# Patient Record
Sex: FEMALE | Race: OTHER | NOT HISPANIC OR LATINO
[De-identification: names, ages, dates, MRNs, and addresses within clinical notes are randomized per-mention and may not be internally consistent; named-entity substitution may affect disease eponyms.]

---

## 2019-01-15 PROBLEM — Z00.00 ENCOUNTER FOR PREVENTIVE HEALTH EXAMINATION: Status: ACTIVE | Noted: 2019-01-15

## 2019-01-16 ENCOUNTER — ASOB RESULT (OUTPATIENT)
Age: 29
End: 2019-01-16

## 2019-01-16 ENCOUNTER — APPOINTMENT (OUTPATIENT)
Age: 29
End: 2019-01-16
Payer: MEDICAID

## 2019-01-16 PROCEDURE — 76816 OB US FOLLOW-UP PER FETUS: CPT

## 2019-02-20 ENCOUNTER — APPOINTMENT (OUTPATIENT)
Age: 29
End: 2019-02-20
Payer: MEDICAID

## 2019-02-20 ENCOUNTER — ASOB RESULT (OUTPATIENT)
Age: 29
End: 2019-02-20

## 2019-02-20 PROCEDURE — 76811 OB US DETAILED SNGL FETUS: CPT

## 2019-04-02 ENCOUNTER — APPOINTMENT (OUTPATIENT)
Dept: MATERNAL FETAL MEDICINE | Facility: CLINIC | Age: 29
End: 2019-04-02

## 2019-04-02 ENCOUNTER — APPOINTMENT (OUTPATIENT)
Dept: ANTEPARTUM | Facility: CLINIC | Age: 29
End: 2019-04-02

## 2019-05-08 ENCOUNTER — TRANSCRIPTION ENCOUNTER (OUTPATIENT)
Age: 29
End: 2019-05-08

## 2019-05-09 ENCOUNTER — INPATIENT (INPATIENT)
Facility: HOSPITAL | Age: 29
LOS: 4 days | Discharge: HOME | End: 2019-05-14
Attending: OBSTETRICS & GYNECOLOGY | Admitting: OBSTETRICS & GYNECOLOGY
Payer: COMMERCIAL

## 2019-05-09 ENCOUNTER — TRANSCRIPTION ENCOUNTER (OUTPATIENT)
Age: 29
End: 2019-05-09

## 2019-05-09 VITALS
HEART RATE: 83 BPM | DIASTOLIC BLOOD PRESSURE: 120 MMHG | TEMPERATURE: 98 F | SYSTOLIC BLOOD PRESSURE: 195 MMHG | RESPIRATION RATE: 19 BRPM

## 2019-05-09 DIAGNOSIS — Z98.891 HISTORY OF UTERINE SCAR FROM PREVIOUS SURGERY: Chronic | ICD-10-CM

## 2019-05-09 DIAGNOSIS — O47.03 FALSE LABOR BEFORE 37 COMPLETED WEEKS OF GESTATION, THIRD TRIMESTER: ICD-10-CM

## 2019-05-09 LAB
ALBUMIN SERPL ELPH-MCNC: 2.6 G/DL — LOW (ref 3.3–5.2)
ALLERGY+IMMUNOLOGY DIAG STUDY NOTE: SIGNIFICANT CHANGE UP
ALP SERPL-CCNC: 271 U/L — HIGH (ref 40–120)
ALT FLD-CCNC: 52 U/L — HIGH
AMYLASE P1 CFR SERPL: 35 U/L — LOW (ref 36–128)
ANION GAP SERPL CALC-SCNC: 13 MMOL/L — SIGNIFICANT CHANGE UP (ref 5–17)
APPEARANCE UR: CLEAR — SIGNIFICANT CHANGE UP
APTT BLD: 28.5 SEC — SIGNIFICANT CHANGE UP (ref 27.5–36.3)
AST SERPL-CCNC: 63 U/L — HIGH
BACTERIA # UR AUTO: NEGATIVE — SIGNIFICANT CHANGE UP
BASOPHILS # BLD AUTO: 0 K/UL — SIGNIFICANT CHANGE UP (ref 0–0.2)
BASOPHILS NFR BLD AUTO: 0.3 % — SIGNIFICANT CHANGE UP (ref 0–2)
BILIRUB SERPL-MCNC: 0.6 MG/DL — SIGNIFICANT CHANGE UP (ref 0.4–2)
BILIRUB UR-MCNC: NEGATIVE — SIGNIFICANT CHANGE UP
BLD GP AB SCN SERPL QL: ABNORMAL
BUN SERPL-MCNC: 19 MG/DL — SIGNIFICANT CHANGE UP (ref 8–20)
CALCIUM SERPL-MCNC: 8.5 MG/DL — LOW (ref 8.6–10.2)
CHLORIDE SERPL-SCNC: 99 MMOL/L — SIGNIFICANT CHANGE UP (ref 98–107)
CO2 SERPL-SCNC: 21 MMOL/L — LOW (ref 22–29)
COLOR SPEC: YELLOW — SIGNIFICANT CHANGE UP
CREAT ?TM UR-MCNC: 132 MG/DL — SIGNIFICANT CHANGE UP
CREAT ?TM UR-MCNC: 281 MG/DL — SIGNIFICANT CHANGE UP
CREAT SERPL-MCNC: 0.9 MG/DL — SIGNIFICANT CHANGE UP (ref 0.5–1.3)
DIFF PNL FLD: ABNORMAL
DIR ANTIGLOB POLYSPECIFIC INTERPRETATION: SIGNIFICANT CHANGE UP
EOSINOPHIL # BLD AUTO: 0.1 K/UL — SIGNIFICANT CHANGE UP (ref 0–0.5)
EOSINOPHIL NFR BLD AUTO: 1.2 % — SIGNIFICANT CHANGE UP (ref 0–6)
EPI CELLS # UR: SIGNIFICANT CHANGE UP
GLUCOSE SERPL-MCNC: 80 MG/DL — SIGNIFICANT CHANGE UP (ref 70–115)
GLUCOSE UR QL: NEGATIVE MG/DL — SIGNIFICANT CHANGE UP
HCT VFR BLD CALC: 49 % — HIGH (ref 37–47)
HGB BLD-MCNC: 16.7 G/DL — HIGH (ref 12–16)
HIV 1 & 2 AB SERPL IA.RAPID: SIGNIFICANT CHANGE UP
INR BLD: 0.81 RATIO — LOW (ref 0.88–1.16)
KETONES UR-MCNC: ABNORMAL
LDH SERPL L TO P-CCNC: 663 U/L — HIGH (ref 98–192)
LEUKOCYTE ESTERASE UR-ACNC: ABNORMAL
LIDOCAIN IGE QN: 29 U/L — SIGNIFICANT CHANGE UP (ref 22–51)
LYMPHOCYTES # BLD AUTO: 2.9 K/UL — SIGNIFICANT CHANGE UP (ref 1–4.8)
LYMPHOCYTES # BLD AUTO: 26.5 % — SIGNIFICANT CHANGE UP (ref 20–55)
MAGNESIUM SERPL-MCNC: 7.5 MG/DL — CRITICAL HIGH (ref 1.8–2.6)
MCHC RBC-ENTMCNC: 28.7 PG — SIGNIFICANT CHANGE UP (ref 27–31)
MCHC RBC-ENTMCNC: 34.1 G/DL — SIGNIFICANT CHANGE UP (ref 32–36)
MCV RBC AUTO: 84.3 FL — SIGNIFICANT CHANGE UP (ref 81–99)
MONOCYTES # BLD AUTO: 1.2 K/UL — HIGH (ref 0–0.8)
MONOCYTES NFR BLD AUTO: 11.4 % — HIGH (ref 3–10)
NEUTROPHILS # BLD AUTO: 6.5 K/UL — SIGNIFICANT CHANGE UP (ref 1.8–8)
NEUTROPHILS NFR BLD AUTO: 60.3 % — SIGNIFICANT CHANGE UP (ref 37–73)
NITRITE UR-MCNC: NEGATIVE — SIGNIFICANT CHANGE UP
PH UR: 6.5 — SIGNIFICANT CHANGE UP (ref 5–8)
PLATELET # BLD AUTO: 117 K/UL — LOW (ref 150–400)
POTASSIUM SERPL-MCNC: 4.4 MMOL/L — SIGNIFICANT CHANGE UP (ref 3.5–5.3)
POTASSIUM SERPL-SCNC: 4.4 MMOL/L — SIGNIFICANT CHANGE UP (ref 3.5–5.3)
PROT ?TM UR-MCNC: SIGNIFICANT CHANGE UP MG/DL (ref 0–12)
PROT ?TM UR-MCNC: SIGNIFICANT CHANGE UP MG/DL (ref 0–12)
PROT SERPL-MCNC: 6 G/DL — LOW (ref 6.6–8.7)
PROT UR-MCNC: 500 MG/DL
PROT/CREAT UR-RTO: SIGNIFICANT CHANGE UP RATIO
PROT/CREAT UR-RTO: SIGNIFICANT CHANGE UP RATIO
PROTHROM AB SERPL-ACNC: 9.2 SEC — LOW (ref 10–12.9)
RBC # BLD: 5.81 M/UL — HIGH (ref 4.4–5.2)
RBC # FLD: 15 % — SIGNIFICANT CHANGE UP (ref 11–15.6)
RBC CASTS # UR COMP ASSIST: ABNORMAL /HPF (ref 0–4)
SODIUM SERPL-SCNC: 133 MMOL/L — LOW (ref 135–145)
SP GR SPEC: 1.01 — SIGNIFICANT CHANGE UP (ref 1.01–1.02)
TYPE + AB SCN PNL BLD: SIGNIFICANT CHANGE UP
URATE SERPL-MCNC: 8.5 MG/DL — HIGH (ref 2.4–5.7)
UROBILINOGEN FLD QL: NEGATIVE MG/DL — SIGNIFICANT CHANGE UP
WBC # BLD: 10.8 K/UL — SIGNIFICANT CHANGE UP (ref 4.8–10.8)
WBC # FLD AUTO: 10.8 K/UL — SIGNIFICANT CHANGE UP (ref 4.8–10.8)
WBC UR QL: SIGNIFICANT CHANGE UP

## 2019-05-09 PROCEDURE — 59514 CESAREAN DELIVERY ONLY: CPT | Mod: 80,U7

## 2019-05-09 PROCEDURE — 74018 RADEX ABDOMEN 1 VIEW: CPT | Mod: 26

## 2019-05-09 RX ORDER — SODIUM CHLORIDE 9 MG/ML
500 INJECTION, SOLUTION INTRAVENOUS ONCE
Refills: 0 | Status: COMPLETED | OUTPATIENT
Start: 2019-05-09 | End: 2019-05-09

## 2019-05-09 RX ORDER — SIMETHICONE 80 MG/1
80 TABLET, CHEWABLE ORAL EVERY 4 HOURS
Refills: 0 | Status: DISCONTINUED | OUTPATIENT
Start: 2019-05-10 | End: 2019-05-14

## 2019-05-09 RX ORDER — LABETALOL HCL 100 MG
200 TABLET ORAL
Refills: 0 | Status: DISCONTINUED | OUTPATIENT
Start: 2019-05-09 | End: 2019-05-09

## 2019-05-09 RX ORDER — SODIUM CHLORIDE 9 MG/ML
1000 INJECTION, SOLUTION INTRAVENOUS
Refills: 0 | Status: DISCONTINUED | OUTPATIENT
Start: 2019-05-09 | End: 2019-05-14

## 2019-05-09 RX ORDER — LANOLIN
1 OINTMENT (GRAM) TOPICAL EVERY 6 HOURS
Refills: 0 | Status: DISCONTINUED | OUTPATIENT
Start: 2019-05-10 | End: 2019-05-14

## 2019-05-09 RX ORDER — MAGNESIUM HYDROXIDE 400 MG/1
30 TABLET, CHEWABLE ORAL
Refills: 0 | Status: DISCONTINUED | OUTPATIENT
Start: 2019-05-10 | End: 2019-05-14

## 2019-05-09 RX ORDER — MAGNESIUM SULFATE 500 MG/ML
2 VIAL (ML) INJECTION
Qty: 40 | Refills: 0 | Status: DISCONTINUED | OUTPATIENT
Start: 2019-05-09 | End: 2019-05-09

## 2019-05-09 RX ORDER — NALOXONE HYDROCHLORIDE 4 MG/.1ML
0.1 SPRAY NASAL
Refills: 0 | Status: DISCONTINUED | OUTPATIENT
Start: 2019-05-09 | End: 2019-05-14

## 2019-05-09 RX ORDER — ACETAMINOPHEN 500 MG
975 TABLET ORAL EVERY 6 HOURS
Refills: 0 | Status: DISCONTINUED | OUTPATIENT
Start: 2019-05-10 | End: 2019-05-14

## 2019-05-09 RX ORDER — GLYCERIN ADULT
1 SUPPOSITORY, RECTAL RECTAL AT BEDTIME
Refills: 0 | Status: DISCONTINUED | OUTPATIENT
Start: 2019-05-10 | End: 2019-05-14

## 2019-05-09 RX ORDER — TETANUS TOXOID, REDUCED DIPHTHERIA TOXOID AND ACELLULAR PERTUSSIS VACCINE, ADSORBED 5; 2.5; 8; 8; 2.5 [IU]/.5ML; [IU]/.5ML; UG/.5ML; UG/.5ML; UG/.5ML
0.5 SUSPENSION INTRAMUSCULAR ONCE
Refills: 0 | Status: DISCONTINUED | OUTPATIENT
Start: 2019-05-10 | End: 2019-05-14

## 2019-05-09 RX ORDER — OXYTOCIN 10 UNIT/ML
333.33 VIAL (ML) INJECTION
Qty: 20 | Refills: 0 | Status: DISCONTINUED | OUTPATIENT
Start: 2019-05-09 | End: 2019-05-09

## 2019-05-09 RX ORDER — MAGNESIUM SULFATE 500 MG/ML
4 VIAL (ML) INJECTION ONCE
Refills: 0 | Status: DISCONTINUED | OUTPATIENT
Start: 2019-05-09 | End: 2019-05-09

## 2019-05-09 RX ORDER — METOCLOPRAMIDE HCL 10 MG
10 TABLET ORAL ONCE
Refills: 0 | Status: DISCONTINUED | OUTPATIENT
Start: 2019-05-09 | End: 2019-05-09

## 2019-05-09 RX ORDER — OXYCODONE HYDROCHLORIDE 5 MG/1
10 TABLET ORAL
Refills: 0 | Status: DISCONTINUED | OUTPATIENT
Start: 2019-05-09 | End: 2019-05-14

## 2019-05-09 RX ORDER — FAMOTIDINE 10 MG/ML
20 INJECTION INTRAVENOUS ONCE
Refills: 0 | Status: DISCONTINUED | OUTPATIENT
Start: 2019-05-09 | End: 2019-05-09

## 2019-05-09 RX ORDER — MAGNESIUM SULFATE 500 MG/ML
4 VIAL (ML) INJECTION ONCE
Refills: 0 | Status: COMPLETED | OUTPATIENT
Start: 2019-05-09 | End: 2019-05-09

## 2019-05-09 RX ORDER — DOCUSATE SODIUM 100 MG
100 CAPSULE ORAL
Refills: 0 | Status: DISCONTINUED | OUTPATIENT
Start: 2019-05-10 | End: 2019-05-14

## 2019-05-09 RX ORDER — SODIUM CHLORIDE 9 MG/ML
1000 INJECTION, SOLUTION INTRAVENOUS
Refills: 0 | Status: DISCONTINUED | OUTPATIENT
Start: 2019-05-09 | End: 2019-05-09

## 2019-05-09 RX ORDER — HYDROMORPHONE HYDROCHLORIDE 2 MG/ML
0.5 INJECTION INTRAMUSCULAR; INTRAVENOUS; SUBCUTANEOUS
Refills: 0 | Status: DISCONTINUED | OUTPATIENT
Start: 2019-05-09 | End: 2019-05-14

## 2019-05-09 RX ORDER — DIPHENHYDRAMINE HCL 50 MG
25 CAPSULE ORAL EVERY 6 HOURS
Refills: 0 | Status: DISCONTINUED | OUTPATIENT
Start: 2019-05-10 | End: 2019-05-14

## 2019-05-09 RX ORDER — MAGNESIUM SULFATE 500 MG/ML
2 VIAL (ML) INJECTION
Qty: 40 | Refills: 0 | Status: DISCONTINUED | OUTPATIENT
Start: 2019-05-09 | End: 2019-05-13

## 2019-05-09 RX ORDER — OXYCODONE HYDROCHLORIDE 5 MG/1
5 TABLET ORAL ONCE
Refills: 0 | Status: DISCONTINUED | OUTPATIENT
Start: 2019-05-10 | End: 2019-05-14

## 2019-05-09 RX ORDER — SODIUM CHLORIDE 9 MG/ML
1000 INJECTION, SOLUTION INTRAVENOUS ONCE
Refills: 0 | Status: DISCONTINUED | OUTPATIENT
Start: 2019-05-09 | End: 2019-05-09

## 2019-05-09 RX ORDER — KETOROLAC TROMETHAMINE 30 MG/ML
30 SYRINGE (ML) INJECTION EVERY 6 HOURS
Refills: 0 | Status: COMPLETED | OUTPATIENT
Start: 2019-05-09 | End: 2019-05-10

## 2019-05-09 RX ORDER — ONDANSETRON 8 MG/1
4 TABLET, FILM COATED ORAL EVERY 6 HOURS
Refills: 0 | Status: DISCONTINUED | OUTPATIENT
Start: 2019-05-09 | End: 2019-05-14

## 2019-05-09 RX ORDER — CITRIC ACID/SODIUM CITRATE 300-500 MG
30 SOLUTION, ORAL ORAL ONCE
Refills: 0 | Status: DISCONTINUED | OUTPATIENT
Start: 2019-05-09 | End: 2019-05-09

## 2019-05-09 RX ORDER — LABETALOL HCL 100 MG
20 TABLET ORAL ONCE
Refills: 0 | Status: COMPLETED | OUTPATIENT
Start: 2019-05-09 | End: 2019-05-09

## 2019-05-09 RX ORDER — IBUPROFEN 200 MG
600 TABLET ORAL EVERY 6 HOURS
Refills: 0 | Status: COMPLETED | OUTPATIENT
Start: 2019-05-10 | End: 2020-04-07

## 2019-05-09 RX ORDER — DIPHENHYDRAMINE HCL 50 MG
50 CAPSULE ORAL EVERY 4 HOURS
Refills: 0 | Status: DISCONTINUED | OUTPATIENT
Start: 2019-05-09 | End: 2019-05-14

## 2019-05-09 RX ORDER — FENTANYL CITRATE 50 UG/ML
30 INJECTION INTRAVENOUS
Refills: 0 | Status: DISCONTINUED | OUTPATIENT
Start: 2019-05-09 | End: 2019-05-10

## 2019-05-09 RX ORDER — LABETALOL HCL 100 MG
200 TABLET ORAL
Refills: 0 | Status: DISCONTINUED | OUTPATIENT
Start: 2019-05-09 | End: 2019-05-11

## 2019-05-09 RX ORDER — OXYCODONE HYDROCHLORIDE 5 MG/1
5 TABLET ORAL
Refills: 0 | Status: DISCONTINUED | OUTPATIENT
Start: 2019-05-10 | End: 2019-05-14

## 2019-05-09 RX ORDER — OXYCODONE HYDROCHLORIDE 5 MG/1
5 TABLET ORAL
Refills: 0 | Status: DISCONTINUED | OUTPATIENT
Start: 2019-05-09 | End: 2019-05-14

## 2019-05-09 RX ORDER — CEFAZOLIN SODIUM 1 G
2000 VIAL (EA) INJECTION EVERY 8 HOURS
Refills: 0 | Status: COMPLETED | OUTPATIENT
Start: 2019-05-09 | End: 2019-05-10

## 2019-05-09 RX ORDER — OXYTOCIN 10 UNIT/ML
333.33 VIAL (ML) INJECTION
Qty: 20 | Refills: 0 | Status: DISCONTINUED | OUTPATIENT
Start: 2019-05-09 | End: 2019-05-14

## 2019-05-09 RX ORDER — CEFTRIAXONE 500 MG/1
2 INJECTION, POWDER, FOR SOLUTION INTRAMUSCULAR; INTRAVENOUS ONCE
Refills: 0 | Status: DISCONTINUED | OUTPATIENT
Start: 2019-05-09 | End: 2019-05-09

## 2019-05-09 RX ADMIN — Medication 30 MILLIGRAM(S): at 18:50

## 2019-05-09 RX ADMIN — HYDROMORPHONE HYDROCHLORIDE 0.5 MILLIGRAM(S): 2 INJECTION INTRAMUSCULAR; INTRAVENOUS; SUBCUTANEOUS at 15:45

## 2019-05-09 RX ADMIN — FENTANYL CITRATE 30 MILLILITER(S): 50 INJECTION INTRAVENOUS at 17:51

## 2019-05-09 RX ADMIN — Medication 50 GM/HR: at 15:25

## 2019-05-09 RX ADMIN — Medication 100 MILLIGRAM(S): at 13:02

## 2019-05-09 RX ADMIN — HYDROMORPHONE HYDROCHLORIDE 0.5 MILLIGRAM(S): 2 INJECTION INTRAMUSCULAR; INTRAVENOUS; SUBCUTANEOUS at 16:15

## 2019-05-09 RX ADMIN — SODIUM CHLORIDE 125 MILLILITER(S): 9 INJECTION, SOLUTION INTRAVENOUS at 22:10

## 2019-05-09 RX ADMIN — Medication 100 MILLIGRAM(S): at 21:20

## 2019-05-09 RX ADMIN — SODIUM CHLORIDE 75 MILLILITER(S): 9 INJECTION, SOLUTION INTRAVENOUS at 18:00

## 2019-05-09 RX ADMIN — Medication 1000 MILLIUNIT(S)/MIN: at 13:10

## 2019-05-09 RX ADMIN — SODIUM CHLORIDE 2000 MILLILITER(S): 9 INJECTION, SOLUTION INTRAVENOUS at 15:05

## 2019-05-09 RX ADMIN — Medication 200 MILLIGRAM(S): at 19:49

## 2019-05-09 RX ADMIN — Medication 20 MILLIGRAM(S): at 12:54

## 2019-05-09 RX ADMIN — Medication 100 GRAM(S): at 14:58

## 2019-05-09 RX ADMIN — Medication 30 MILLIGRAM(S): at 18:27

## 2019-05-09 NOTE — CHART NOTE - NSCHARTNOTEFT_GEN_A_CORE
S: Patient states feels better. She denies SOB, headache, visual disturbances, RUQ pain, N/V or feeling of being lightheaded.    O:   Vital signs- BP: 116/78 P:86  SaO2: 95% on room air  Urine output: 30 cc past hour   Mag level: 7.5    General Appearance: able to answer all questions approp.  Cardiovascular: RRR, S1 and S2 WNL   + lower extremity edema   Lungs: clear lung sounds bilaterally   Extremities: DTRs 2+ brachial, negative clonus      A/P: 28 yo  at 35 weeks s/p  and MgSO4 for Preeclampsia, No signs/symptoms of Mg toxicity.   - Elevated Mg 7.5  - Will hold Mg (20h10)     Luke Powers MD PGY-1  D/w Dr. Rodriguez

## 2019-05-09 NOTE — OB PROVIDER H&P - ATTENDING COMMENTS
Patient presented with complains of back pain, headache. feeling like "blood pressure is elevated", denies any visual disturbances.   Of note we haven't seen her since March as she left the country until the last week.   severe range BP's  IV started, blood work collected, IV labetalol 20mg given  patient had a decel, then we were unable to find heartbeat on US was in the 70's,  decision was made to proceed with STAT  section- verbal consent obtained   mag for neuroprocection was ordered but not given in time  ppalos

## 2019-05-09 NOTE — OB PROVIDER H&P - HISTORY OF PRESENT ILLNESS
28 yo  at EGA 35 weeks, presented to L&D complaining of back pain, headache, visual disturbances for last 2 days. Returned from [place] after 2 month stay overseas 2 days ago and says that symptoms started when having landed. Says pain was located over epigastric area and radiated to the back. Denies any fevers, chills, chest pain. No loss of fluids, bleeding, discharge per vagina.     POBhx:  , pCS  for arrest of labor and decrease fetal HR  Denies other PMH.     Meds: Denies  Social: No smoking, drinking or drug use.

## 2019-05-09 NOTE — OB PROVIDER H&P - ASSESSMENT
28 yo  at 35 weeks EGA, found in L&D to have Hypertensive Emergency with decreased fetal HR and inability to reacquire proper monitoring tones. Was taken for Stat  due to fetal distress. 30 yo  at 35 weeks EGA, found in L&D to have Hypertensive Emergency with decreased fetal HR and inability to reacquire proper monitoring tones. Was taken for Stat  due to fetal distress.   Dr Prather and Resident Dr. Santillan at bedside

## 2019-05-09 NOTE — OB PROVIDER H&P - NSHPPHYSICALEXAM_GEN_ALL_CORE
Vital Signs Last 24 Hrs  T(C): 36.4 (09 May 2019 12:22), Max: 36.4 (09 May 2019 12:22)  T(F): 97.5 (09 May 2019 12:22), Max: 97.5 (09 May 2019 12:22)  HR: 78 (09 May 2019 12:56) (78 - 90)  BP: 196/111 (09 May 2019 12:50) (181/123 - 200/113)  BP(mean): --  RR: 19 (09 May 2019 12:22) (19 - 19)  SpO2: 100% (09 May 2019 12:56) (98% - 100%)

## 2019-05-09 NOTE — OB NEONATOLOGY/PEDIATRICIAN DELIVERY SUMMARY - NSPEDSNEONOTESA_OBGYN_ALL_OB_FT
Neonatologist called stat to OR #1 by Olinda Prather MD to attend Stat repeat C/S for birth of this 32 0/7 weeks infant after severe maternal hypertension.  Mother is a  31 year old G 3 P , blood type A negative, serology NR HBsAg negative, GBS unknown, HIV negative, Rubella immune mother with EDC 2019.  Denies allergies, denies hypertension, denies diabetes, denies Asthma.  Social History: single, denies smoking, denies alcohol abuse, denies illicit drug use.  Family history: unremarkable  ROS: unobtainable in .  Labor and Delivery.:  AROM 2019 @ C/S with clear amniotic fluid.  Upon delivery 2019@ 1309 hours, infant floppy, apneic and blue, placed on bed under radiant warmer, dried positioned and suctioned with bulb syringe.   Administered PPV via T-piece resuscitator with 40 % oxygen and increased to 100 & over 5 minutes.  Infant intubated with 3 ETT and administered PPV.  Chest compressions initiated when HR not well auscultated.  Good, equal breath sounds auscultated.  Good tone noted with increased spontaneous respirations, infant extubated.   Saturations noted to be less than 80 infant unsuccessfully reintubated.  PPV via T-piece resuscitator with mask continued.  Saturations 96 % on 100 % oxygen with a  as infant was transferred to NICU on Panda radiant warmer bed.  Apgar score 3, 4 and 7 at 1, 5 and 10 minutes respectively.  Infant transferred to NICU.  Future care with neonatology service.

## 2019-05-10 ENCOUNTER — RESULT REVIEW (OUTPATIENT)
Age: 29
End: 2019-05-10

## 2019-05-10 LAB
MAGNESIUM SERPL-MCNC: 4.6 MG/DL — HIGH (ref 1.6–2.6)
T PALLIDUM AB TITR SER: NEGATIVE — SIGNIFICANT CHANGE UP

## 2019-05-10 RX ORDER — KETOROLAC TROMETHAMINE 30 MG/ML
30 SYRINGE (ML) INJECTION EVERY 6 HOURS
Refills: 0 | Status: COMPLETED | OUTPATIENT
Start: 2019-05-10 | End: 2019-05-11

## 2019-05-10 RX ADMIN — Medication 100 MILLIGRAM(S): at 08:33

## 2019-05-10 RX ADMIN — Medication 975 MILLIGRAM(S): at 17:00

## 2019-05-10 RX ADMIN — Medication 200 MILLIGRAM(S): at 08:33

## 2019-05-10 RX ADMIN — Medication 975 MILLIGRAM(S): at 23:23

## 2019-05-10 RX ADMIN — Medication 200 MILLIGRAM(S): at 19:06

## 2019-05-10 RX ADMIN — Medication 100 MILLIGRAM(S): at 16:09

## 2019-05-10 RX ADMIN — Medication 975 MILLIGRAM(S): at 09:52

## 2019-05-10 RX ADMIN — Medication 975 MILLIGRAM(S): at 10:50

## 2019-05-10 RX ADMIN — Medication 975 MILLIGRAM(S): at 16:10

## 2019-05-10 NOTE — PROGRESS NOTE ADULT - SUBJECTIVE AND OBJECTIVE BOX
29y year old  now POD#1 s/p  section at 32wks gestation.     No acute overnight events. Pain well controlled.  Patient is ambulating, +haddad catheter still in place and set to come out later today, -flatus, -BM  Reports minimal lochia.   +breast feeding, -breast tenderness    VS:   Vital Signs Last 24 Hrs  T(C): 36.5 (10 May 2019 04:20), Max: 36.8 (09 May 2019 15:01)  T(F): 97.7 (10 May 2019 04:20), Max: 98.2 (09 May 2019 15:01)  HR: 77 (10 May 2019 04:20) (0 - 90)  BP: 98/66 (10 May 2019 04:20) (98/66 - 200/113)  RR: 18 (10 May 2019 04:20) (14 - 22)  SpO2: 95% (10 May 2019 04:20) (92% - 100%)    Physical Exam:  General: NAD  Abdomen: soft, ND, firm fundus palpated below the umbilicus. PREVENA dressing in place and intact, no bleeding or drainage.  Ext: nontender lower extremities, bilaterally.     Labs: post op CBC pending

## 2019-05-10 NOTE — DISCHARGE NOTE OB - MEDICATION SUMMARY - MEDICATIONS TO TAKE
I will START or STAY ON the medications listed below when I get home from the hospital:    ibuprofen 600 mg oral tablet  -- 1 tab(s) by mouth every 6 hours   -- Do not take this drug if you are pregnant.  It is very important that you take or use this exactly as directed.  Do not skip doses or discontinue unless directed by your doctor.  May cause drowsiness or dizziness.  Obtain medical advice before taking any non-prescription drugs as some may affect the action of this medication.  Take with food or milk.    -- Indication: For Pain    Percocet 5/325 oral tablet  -- 1 tab(s) by mouth every 6 hours MDD:4 tabs  -- Caution federal law prohibits the transfer of this drug to any person other  than the person for whom it was prescribed.  May cause drowsiness.  Alcohol may intensify this effect.  Use care when operating dangerous machinery.  This prescription cannot be refilled.  This product contains acetaminophen.  Do not use  with any other product containing acetaminophen to prevent possible liver damage.  Using more of this medication than prescribed may cause serious breathing problems.    -- Indication: For severe pain    labetalol 200 mg oral tablet  -- 1 tab(s) by mouth 2 times a day   -- It is very important that you take or use this exactly as directed.  Do not skip doses or discontinue unless directed by your doctor.  May cause drowsiness.  Alcohol may intensify this effect.  Use care when operating dangerous machinery.  Some non-prescription drugs may aggravate your condition.  Read all labels carefully.  If a warning appears, check with your doctor before taking.    -- Indication: For High blood pressure I will START or STAY ON the medications listed below when I get home from the hospital:    ibuprofen 600 mg oral tablet  -- 1 tab(s) by mouth every 6 hours   -- Do not take this drug if you are pregnant.  It is very important that you take or use this exactly as directed.  Do not skip doses or discontinue unless directed by your doctor.  May cause drowsiness or dizziness.  Obtain medical advice before taking any non-prescription drugs as some may affect the action of this medication.  Take with food or milk.    -- Indication: For Pain    labetalol 100 mg oral tablet  -- 2 tab(s) by mouth 2 times a day   -- It is very important that you take or use this exactly as directed.  Do not skip doses or discontinue unless directed by your doctor.  May cause drowsiness.  Alcohol may intensify this effect.  Use care when operating dangerous machinery.  Some non-prescription drugs may aggravate your condition.  Read all labels carefully.  If a warning appears, check with your doctor before taking.    -- Indication: For High blood pressure medication    Procardia XL 30 mg oral tablet, extended release  -- 1 tab(s) by mouth once a day (in the morning)   -- Avoid grapefruit and grapefruit juice while taking this medication.  It is very important that you take or use this exactly as directed.  Do not skip doses or discontinue unless directed by your doctor.  Some non-prescription drugs may aggravate your condition.  Read all labels carefully.  If a warning appears, check with your doctor before taking.  Swallow whole.  Do not crush.    -- Indication: For High blood pressure medication I will START or STAY ON the medications listed below when I get home from the hospital:    ibuprofen 600 mg oral tablet  -- 1 tab(s) by mouth every 6 hours   -- Do not take this drug if you are pregnant.  It is very important that you take or use this exactly as directed.  Do not skip doses or discontinue unless directed by your doctor.  May cause drowsiness or dizziness.  Obtain medical advice before taking any non-prescription drugs as some may affect the action of this medication.  Take with food or milk.    -- Indication: For Pain    labetalol 200 mg oral tablet  -- 2 tab(s) by mouth 2 times a day   -- It is very important that you take or use this exactly as directed.  Do not skip doses or discontinue unless directed by your doctor.  May cause drowsiness.  Alcohol may intensify this effect.  Use care when operating dangerous machinery.  Some non-prescription drugs may aggravate your condition.  Read all labels carefully.  If a warning appears, check with your doctor before taking.    -- Indication: For High blood pressure medication    Procardia XL 30 mg oral tablet, extended release  -- 1 tab(s) by mouth once a day (in the morning)   -- Avoid grapefruit and grapefruit juice while taking this medication.  It is very important that you take or use this exactly as directed.  Do not skip doses or discontinue unless directed by your doctor.  Some non-prescription drugs may aggravate your condition.  Read all labels carefully.  If a warning appears, check with your doctor before taking.  Swallow whole.  Do not crush.    -- Indication: For High blood pressure medication

## 2019-05-10 NOTE — CHART NOTE - NSCHARTNOTEFT_GEN_A_CORE
Provider called to assess patient's Prevena wound vac.  Patient has no complaints, aside from the noise made from the wound-vac.   Prevena in place with good vacuum, no fluid vacuumed.   Left service light blinked momentarily, indicating possible leak, spontaneously corrected.  Device is quite loud.   Uterine fundus firm.  Normal wound-vac function.

## 2019-05-10 NOTE — DISCHARGE NOTE OB - PLAN OF CARE
Recovery Patient should transition to regular activity level. Resume regular diet. Patient should follow up with her OB for a postpartum checkup 1-2 weeks after discharge from the hospital. Patient should call her doctor sooner if she develops a fever or uncontrolled vaginal bleeding.

## 2019-05-10 NOTE — DISCHARGE NOTE OB - CARE PLAN
Principal Discharge DX:	 delivery, delivered, current hospitalization  Goal:	Recovery  Assessment and plan of treatment:	Patient should transition to regular activity level. Resume regular diet. Patient should follow up with her OB for a postpartum checkup 1-2 weeks after discharge from the hospital. Patient should call her doctor sooner if she develops a fever or uncontrolled vaginal bleeding.

## 2019-05-10 NOTE — DISCHARGE NOTE OB - PATIENT PORTAL LINK FT
You can access the [email or web address], offered by [facility], by registering with the following website: [email or web address]

## 2019-05-10 NOTE — DISCHARGE NOTE OB - CARE PROVIDER_API CALL
Olinda Prather)  Obstetrics and Gynecology  [address] [place] [address]  Phone: [phone]  Fax: [phone]  Follow Up Time:

## 2019-05-10 NOTE — DISCHARGE NOTE OB - HOSPITAL COURSE
Patient s/p pCS at 32 weeks for category III tracing and pre-eclampsia with severe features. S/P Mg and on labetalol 200mg BID. Patient did not receive rhogam during pregnancy and has anti D antibodies. At the time of discharge patient was tolerating a regular diet, ambulating without assistance, voiding spontaneously and pain was well controlled with PRN medications. Patient aware of plan to follow up with her OB 1-2 weeks after discharge for BP check.

## 2019-05-10 NOTE — OB PROVIDER DELIVERY SUMMARY - NSPROVIDERDELIVERYNOTE_OBGYN_ALL_OB_FT
28 y/o  at 32w0d evaluated for severe range hypertension and Cat III tracing, decision was made for an emergent  section.   Repeat low transverse  section performed, a viable female infant delivered at 1309, placenta delivered at 1310, weight 1010 (2lbs 3oz). APGARS 3, 4, 7 at 1m, 5m and 10m.  Hysterotomy, rectus muscles, fascia, and skin reapproximated with suture, excellent hemostasis obtained at each layer of the closure.   EBL: 750 30 y/o  at 32w0d evaluated for severe range hypertension and Cat III tracing, decision was made for an emergent  section.   Repeat low transverse  section performed, a viable female infant delivered at 1309, placenta delivered at 1310, weight 1010 (2lbs 3oz). APGARS 3, 4, 7 at 1m, 5m and 10m.  Hysterotomy, rectus muscles, fascia, and skin reapproximated with suture, excellent hemostasis obtained at each layer of the closure.   EBL: 750    I was present throughout procedure  STAT  for fetal bradycardia @ 32 weeks with severe pre-eclampsia  Provena placed   ppalos

## 2019-05-11 DIAGNOSIS — O14.90 UNSPECIFIED PRE-ECLAMPSIA, UNSPECIFIED TRIMESTER: ICD-10-CM

## 2019-05-11 LAB
EOSINOPHIL NFR BLD AUTO: 1 % — SIGNIFICANT CHANGE UP (ref 0–5)
HCT VFR BLD CALC: 36.2 % — LOW (ref 37–47)
HGB BLD-MCNC: 11.5 G/DL — LOW (ref 12–16)
LYMPHOCYTES # BLD AUTO: 16 % — LOW (ref 20–55)
MCHC RBC-ENTMCNC: 27.9 PG — SIGNIFICANT CHANGE UP (ref 27–31)
MCHC RBC-ENTMCNC: 31.8 G/DL — LOW (ref 32–36)
MCV RBC AUTO: 87.9 FL — SIGNIFICANT CHANGE UP (ref 81–99)
MONOCYTES NFR BLD AUTO: 3 % — SIGNIFICANT CHANGE UP (ref 3–10)
NEUTROPHILS NFR BLD AUTO: 79 % — HIGH (ref 37–73)
NEUTS BAND # BLD: 1 % — SIGNIFICANT CHANGE UP (ref 0–8)
PLAT MORPH BLD: NORMAL — SIGNIFICANT CHANGE UP
PLATELET # BLD AUTO: 131 K/UL — LOW (ref 150–400)
RBC # BLD: 4.12 M/UL — LOW (ref 4.4–5.2)
RBC # FLD: 15.6 % — SIGNIFICANT CHANGE UP (ref 11–15.6)
RBC BLD AUTO: NORMAL — SIGNIFICANT CHANGE UP
WBC # BLD: 10.3 K/UL — SIGNIFICANT CHANGE UP (ref 4.8–10.8)
WBC # FLD AUTO: 10.3 K/UL — SIGNIFICANT CHANGE UP (ref 4.8–10.8)

## 2019-05-11 RX ORDER — IBUPROFEN 200 MG
600 TABLET ORAL EVERY 6 HOURS
Refills: 0 | Status: DISCONTINUED | OUTPATIENT
Start: 2019-05-11 | End: 2019-05-14

## 2019-05-11 RX ORDER — LABETALOL HCL 100 MG
200 TABLET ORAL THREE TIMES A DAY
Refills: 0 | Status: DISCONTINUED | OUTPATIENT
Start: 2019-05-11 | End: 2019-05-14

## 2019-05-11 RX ADMIN — Medication 975 MILLIGRAM(S): at 00:20

## 2019-05-11 RX ADMIN — Medication 200 MILLIGRAM(S): at 05:19

## 2019-05-11 RX ADMIN — Medication 600 MILLIGRAM(S): at 17:50

## 2019-05-11 RX ADMIN — Medication 200 MILLIGRAM(S): at 16:52

## 2019-05-11 RX ADMIN — OXYCODONE HYDROCHLORIDE 5 MILLIGRAM(S): 5 TABLET ORAL at 06:20

## 2019-05-11 RX ADMIN — Medication 600 MILLIGRAM(S): at 16:52

## 2019-05-11 RX ADMIN — OXYCODONE HYDROCHLORIDE 5 MILLIGRAM(S): 5 TABLET ORAL at 05:28

## 2019-05-11 NOTE — PROGRESS NOTE ADULT - SUBJECTIVE AND OBJECTIVE BOX
29y year old  now POD#2 s/p  section at 32wks gestation.     No acute overnight events. Pain well controlled.  Patient is ambulating, +voiding, +Flatus, -BM  Reports minimal lochia.   +breast feeding, -breast tenderness    VS:   Vital Signs Last 24 Hrs  T(C): 36.6 (11 May 2019 05:44), Max: 36.9 (10 May 2019 19:43)  T(F): 97.9 (11 May 2019 05:44), Max: 98.4 (10 May 2019 19:43)  HR: 80 (11 May 2019 05:44) (65 - 80)  BP: 145/89 (11 May 2019 06:44) (119/79 - 158/92)  RR: 18 (11 May 2019 05:44) (18 - 18)  SpO2: 96% (10 May 2019 18:58) (95% - 96%)    Physical Exam:  General: NAD  Abdomen: soft, ND, firm fundus palpated at the umbilicus. Prevena dressing in place, intact, no blood or drainage.   Ext: nontender lower extremities, bilaterally.     Labs: pending post op CBC 29y year old  now POD#2 s/p  section at 32wks gestation.     No acute overnight events. Pain well controlled.  Patient is ambulating, +voiding, +Flatus, -BM  Reports minimal lochia.   +breast feeding, -breast tenderness    VS:   Vital Signs Last 24 Hrs  T(C): 36.6 (11 May 2019 05:44), Max: 36.9 (10 May 2019 19:43)  T(F): 97.9 (11 May 2019 05:44), Max: 98.4 (10 May 2019 19:43)  HR: 80 (11 May 2019 05:44) (65 - 80)  BP: 145/89 (11 May 2019 06:44) (119/79 - 158/92)  RR: 18 (11 May 2019 05:44) (18 - 18)  SpO2: 96% (10 May 2019 18:58) (95% - 96%)    Physical Exam:  General: NAD  Breasts: not engorged  Abdomen: soft, ND, firm fundus palpated at the umbilicus. Prevena dressing in place, intact, no blood or drainage.   Ext: nontender lower extremities, bilaterally.     Labs: pending post op CBC

## 2019-05-12 RX ORDER — IBUPROFEN 200 MG
1 TABLET ORAL
Qty: 28 | Refills: 0
Start: 2019-05-12 | End: 2019-05-18

## 2019-05-12 RX ORDER — LABETALOL HCL 100 MG
1 TABLET ORAL
Qty: 28 | Refills: 0
Start: 2019-05-12 | End: 2019-05-25

## 2019-05-12 RX ADMIN — Medication 600 MILLIGRAM(S): at 05:53

## 2019-05-12 RX ADMIN — Medication 975 MILLIGRAM(S): at 21:18

## 2019-05-12 RX ADMIN — Medication 975 MILLIGRAM(S): at 22:15

## 2019-05-12 RX ADMIN — Medication 200 MILLIGRAM(S): at 00:29

## 2019-05-12 RX ADMIN — Medication 975 MILLIGRAM(S): at 00:32

## 2019-05-12 RX ADMIN — Medication 200 MILLIGRAM(S): at 21:08

## 2019-05-12 RX ADMIN — Medication 975 MILLIGRAM(S): at 13:02

## 2019-05-12 RX ADMIN — Medication 200 MILLIGRAM(S): at 13:02

## 2019-05-12 RX ADMIN — Medication 975 MILLIGRAM(S): at 14:00

## 2019-05-12 RX ADMIN — Medication 200 MILLIGRAM(S): at 05:53

## 2019-05-12 RX ADMIN — Medication 975 MILLIGRAM(S): at 01:30

## 2019-05-12 NOTE — PROGRESS NOTE ADULT - SUBJECTIVE AND OBJECTIVE BOX
29y year old  now POD#3 s/p  section at 32wks gestation.     No acute overnight events. Pain well controlled.  Patient is ambulating, +voiding, +Flatus, -BM  Reports minimal lochia.   +breast feeding, -breast tenderness    VS:   Vital Signs Last 24 Hrs  T(C): 36.4 (11 May 2019 19:14), Max: 36.8 (11 May 2019 16:18)  T(F): 97.6 (11 May 2019 19:14), Max: 98.2 (11 May 2019 16:18)  HR: 78 (11 May 2019 23:00) (66 - 80)  BP: 144/87 (11 May 2019 23:00) (117/77 - 156/97)  BP(mean): --  RR: 20 (11 May 2019 23:00) (18 - 20)  SpO2: 97% (11 May 2019 19:14) (97% - 98%)    Physical Exam:  General: NAD  Abdomen: soft, ND, firm fundus palpated at the umbilicus. PREVENA dressing in place, intact.   Ext: nontender lower extremities, bilaterally.     Labs:                        11.5   10.3  )-----------( 131      ( 11 May 2019 09:59 )             36.2 29y year old  now POD#3 s/p  section at 32wks gestation.     No acute overnight events. Pain well controlled.  Patient is ambulating, +voiding, +Flatus, -BM  Reports minimal lochia.   +breast feeding, -breast tenderness    VS:   Vital Signs Last 24 Hrs  T(C): 36.4 (11 May 2019 19:14), Max: 36.8 (11 May 2019 16:18)  T(F): 97.6 (11 May 2019 19:14), Max: 98.2 (11 May 2019 16:18)  HR: 78 (11 May 2019 23:00) (66 - 80)  BP: 144/87 (11 May 2019 23:00) (117/77 - 156/97)  BP(mean): --  RR: 20 (11 May 2019 23:00) (18 - 20)  SpO2: 97% (11 May 2019 19:14) (97% - 98%)    Physical Exam:  General: NAD  Breasts note engorged  Abdomen: soft, ND, firm fundus palpated at the umbilicus. PREVENA dressing in place, intact.   Ext: nontender lower extremities, bilaterally.   lochia minimal    Labs:                        11.5   10.3  )-----------( 131      ( 11 May 2019 09:59 )             36.2

## 2019-05-13 LAB
ALBUMIN SERPL ELPH-MCNC: 2.2 G/DL — LOW (ref 3.3–5.2)
ALP SERPL-CCNC: 134 U/L — HIGH (ref 40–120)
ALT FLD-CCNC: 20 U/L — SIGNIFICANT CHANGE UP
ANION GAP SERPL CALC-SCNC: 12 MMOL/L — SIGNIFICANT CHANGE UP (ref 5–17)
APTT BLD: 27.9 SEC — SIGNIFICANT CHANGE UP (ref 27.5–36.3)
AST SERPL-CCNC: 36 U/L — HIGH
BILIRUB SERPL-MCNC: 0.3 MG/DL — LOW (ref 0.4–2)
BUN SERPL-MCNC: 13 MG/DL — SIGNIFICANT CHANGE UP (ref 8–20)
CALCIUM SERPL-MCNC: 8 MG/DL — LOW (ref 8.6–10.2)
CHLORIDE SERPL-SCNC: 104 MMOL/L — SIGNIFICANT CHANGE UP (ref 98–107)
CO2 SERPL-SCNC: 25 MMOL/L — SIGNIFICANT CHANGE UP (ref 22–29)
CREAT SERPL-MCNC: 0.64 MG/DL — SIGNIFICANT CHANGE UP (ref 0.5–1.3)
FIBRINOGEN PPP-MCNC: 691 MG/DL — HIGH (ref 350–510)
GLUCOSE SERPL-MCNC: 82 MG/DL — SIGNIFICANT CHANGE UP (ref 70–115)
HCT VFR BLD CALC: 33.9 % — LOW (ref 37–47)
HGB BLD-MCNC: 10.8 G/DL — LOW (ref 12–16)
INR BLD: 0.82 RATIO — LOW (ref 0.88–1.16)
LDH SERPL L TO P-CCNC: 386 U/L — HIGH (ref 98–192)
MCHC RBC-ENTMCNC: 28.1 PG — SIGNIFICANT CHANGE UP (ref 27–31)
MCHC RBC-ENTMCNC: 31.9 G/DL — LOW (ref 32–36)
MCV RBC AUTO: 88.1 FL — SIGNIFICANT CHANGE UP (ref 81–99)
PLATELET # BLD AUTO: 209 K/UL — SIGNIFICANT CHANGE UP (ref 150–400)
POTASSIUM SERPL-MCNC: 4.4 MMOL/L — SIGNIFICANT CHANGE UP (ref 3.5–5.3)
POTASSIUM SERPL-SCNC: 4.4 MMOL/L — SIGNIFICANT CHANGE UP (ref 3.5–5.3)
PROT SERPL-MCNC: 4.8 G/DL — LOW (ref 6.6–8.7)
PROTHROM AB SERPL-ACNC: 9.4 SEC — LOW (ref 10–12.9)
RBC # BLD: 3.85 M/UL — LOW (ref 4.4–5.2)
RBC # FLD: 15.6 % — SIGNIFICANT CHANGE UP (ref 11–15.6)
SODIUM SERPL-SCNC: 141 MMOL/L — SIGNIFICANT CHANGE UP (ref 135–145)
URATE SERPL-MCNC: 6.7 MG/DL — HIGH (ref 2.4–5.7)
WBC # BLD: 8.2 K/UL — SIGNIFICANT CHANGE UP (ref 4.8–10.8)
WBC # FLD AUTO: 8.2 K/UL — SIGNIFICANT CHANGE UP (ref 4.8–10.8)

## 2019-05-13 RX ORDER — HYDRALAZINE HCL 50 MG
5 TABLET ORAL ONCE
Refills: 0 | Status: COMPLETED | OUTPATIENT
Start: 2019-05-13 | End: 2019-05-13

## 2019-05-13 RX ORDER — NIFEDIPINE 30 MG
30 TABLET, EXTENDED RELEASE 24 HR ORAL ONCE
Refills: 0 | Status: COMPLETED | OUTPATIENT
Start: 2019-05-13 | End: 2019-05-13

## 2019-05-13 RX ORDER — HYDRALAZINE HCL 50 MG
10 TABLET ORAL ONCE
Refills: 0 | Status: COMPLETED | OUTPATIENT
Start: 2019-05-13 | End: 2019-05-13

## 2019-05-13 RX ORDER — HYDRALAZINE HCL 50 MG
10 TABLET ORAL ONCE
Refills: 0 | Status: DISCONTINUED | OUTPATIENT
Start: 2019-05-13 | End: 2019-05-13

## 2019-05-13 RX ADMIN — Medication 975 MILLIGRAM(S): at 15:13

## 2019-05-13 RX ADMIN — Medication 200 MILLIGRAM(S): at 05:16

## 2019-05-13 RX ADMIN — Medication 200 MILLIGRAM(S): at 13:30

## 2019-05-13 RX ADMIN — Medication 5 MILLIGRAM(S): at 09:04

## 2019-05-13 RX ADMIN — Medication 600 MILLIGRAM(S): at 19:57

## 2019-05-13 RX ADMIN — Medication 975 MILLIGRAM(S): at 16:13

## 2019-05-13 RX ADMIN — Medication 200 MILLIGRAM(S): at 21:24

## 2019-05-13 RX ADMIN — Medication 600 MILLIGRAM(S): at 02:00

## 2019-05-13 RX ADMIN — Medication 975 MILLIGRAM(S): at 22:25

## 2019-05-13 RX ADMIN — Medication 975 MILLIGRAM(S): at 21:25

## 2019-05-13 RX ADMIN — Medication 600 MILLIGRAM(S): at 01:10

## 2019-05-13 RX ADMIN — Medication 10 MILLIGRAM(S): at 06:58

## 2019-05-13 RX ADMIN — Medication 600 MILLIGRAM(S): at 13:30

## 2019-05-13 RX ADMIN — Medication 600 MILLIGRAM(S): at 14:30

## 2019-05-13 RX ADMIN — Medication 600 MILLIGRAM(S): at 20:57

## 2019-05-13 RX ADMIN — Medication 10 MILLIGRAM(S): at 07:20

## 2019-05-13 RX ADMIN — Medication 30 MILLIGRAM(S): at 08:03

## 2019-05-13 NOTE — PROGRESS NOTE ADULT - SUBJECTIVE AND OBJECTIVE BOX
Postpartum Note,  Section  Patient is a 29y  s/p repeat  post-operative day 4.    Subjective:  No acute events overnight. The patient is feeling well.   She is tolerating a diet and denies N/V.    Patient is having normal postpartum bleeding which is decreasing in amount.    She is breastfeeding and the baby is latching on.    Urinating appropriately.   +BM/+flatus.    Physical exam:    Vital Signs Last 24 Hrs  T(C): 36.7 (13 May 2019 05:12), Max: 36.9 (12 May 2019 09:00)  T(F): 98 (13 May 2019 05:12), Max: 98.5 (12 May 2019 09:00)  HR: 66 (13 May 2019 05:12) (65 - 78)  BP: 168/92 (13 May 2019 05:12) (135/82 - 170/88)  RR: 18 (13 May 2019 05:12) (18 - 20)  SpO2: 95% (13 May 2019 05:12) (95% - 98%)    Heart: RRR  Lungs: CTABL  Breast: non tender, not engorged   Abdomen: Soft, nontender, no distension, firm uterine fundus, the Provena dressing is removed, the incision is clean dry and intact  Ext: No DVT signs, warm extremities    LABS:                        11.5   10.3  )-----------( 131      ( 11 May 2019 09:59 )             36.2

## 2019-05-13 NOTE — PROVIDER CONTACT NOTE (OTHER) - ASSESSMENT
pt status post hydralazine administration at 06:58. current repeat blood pressure was 178/90. pt still continues to deny headache, blurry vision, epigastric pain, dizziness

## 2019-05-13 NOTE — PROVIDER CONTACT NOTE (OTHER) - SITUATION
pt from [facility] unit day 4 PP. Blood pressure at 06:50 was 209/110. pt denies headache, dizziness, blurry vision, epigastric pain etc

## 2019-05-13 NOTE — PROVIDER CONTACT NOTE (OTHER) - RECOMMENDATIONS
As per , obtain IV access on pt at this time and administer 10 MG hydralazine IV-Push. repeat blood pressure 20 minutes after administration of hydralazine and continue to monitor

## 2019-05-13 NOTE — PROVIDER CONTACT NOTE (OTHER) - BACKGROUND
pt from [facility] unit day 4 PP. Blood pressure at 06:50 was 209/110. pt denies headache, dizziness, blurry vision, epigastric pain etc

## 2019-05-13 NOTE — PROVIDER CONTACT NOTE (OTHER) - ASSESSMENT
pt from [facility] unit day 4 PP. Blood pressure at 06:50 was 209/110. pt denies headache, dizziness, blurry vision, epigastric pain etc

## 2019-05-14 VITALS — SYSTOLIC BLOOD PRESSURE: 138 MMHG | DIASTOLIC BLOOD PRESSURE: 88 MMHG

## 2019-05-14 PROCEDURE — 86880 COOMBS TEST DIRECT: CPT

## 2019-05-14 PROCEDURE — 81001 URINALYSIS AUTO W/SCOPE: CPT

## 2019-05-14 PROCEDURE — 86901 BLOOD TYPING SEROLOGIC RH(D): CPT

## 2019-05-14 PROCEDURE — 82570 ASSAY OF URINE CREATININE: CPT

## 2019-05-14 PROCEDURE — 82150 ASSAY OF AMYLASE: CPT

## 2019-05-14 PROCEDURE — 84156 ASSAY OF PROTEIN URINE: CPT

## 2019-05-14 PROCEDURE — 88307 TISSUE EXAM BY PATHOLOGIST: CPT

## 2019-05-14 PROCEDURE — 88304 TISSUE EXAM BY PATHOLOGIST: CPT

## 2019-05-14 PROCEDURE — 86703 HIV-1/HIV-2 1 RESULT ANTBDY: CPT

## 2019-05-14 PROCEDURE — 85384 FIBRINOGEN ACTIVITY: CPT

## 2019-05-14 PROCEDURE — 85610 PROTHROMBIN TIME: CPT

## 2019-05-14 PROCEDURE — 86780 TREPONEMA PALLIDUM: CPT

## 2019-05-14 PROCEDURE — 80053 COMPREHEN METABOLIC PANEL: CPT

## 2019-05-14 PROCEDURE — 74018 RADEX ABDOMEN 1 VIEW: CPT

## 2019-05-14 PROCEDURE — 86900 BLOOD TYPING SEROLOGIC ABO: CPT

## 2019-05-14 PROCEDURE — 86850 RBC ANTIBODY SCREEN: CPT

## 2019-05-14 PROCEDURE — 85730 THROMBOPLASTIN TIME PARTIAL: CPT

## 2019-05-14 PROCEDURE — 84550 ASSAY OF BLOOD/URIC ACID: CPT

## 2019-05-14 PROCEDURE — 83735 ASSAY OF MAGNESIUM: CPT

## 2019-05-14 PROCEDURE — 83615 LACTATE (LD) (LDH) ENZYME: CPT

## 2019-05-14 PROCEDURE — 36415 COLL VENOUS BLD VENIPUNCTURE: CPT

## 2019-05-14 PROCEDURE — 85027 COMPLETE CBC AUTOMATED: CPT

## 2019-05-14 PROCEDURE — 83690 ASSAY OF LIPASE: CPT

## 2019-05-14 PROCEDURE — 86870 RBC ANTIBODY IDENTIFICATION: CPT

## 2019-05-14 RX ORDER — NIFEDIPINE 30 MG
30 TABLET, EXTENDED RELEASE 24 HR ORAL ONCE
Refills: 0 | Status: COMPLETED | OUTPATIENT
Start: 2019-05-14 | End: 2019-05-14

## 2019-05-14 RX ORDER — LABETALOL HCL 100 MG
2 TABLET ORAL
Qty: 120 | Refills: 0
Start: 2019-05-14 | End: 2019-06-12

## 2019-05-14 RX ORDER — NIFEDIPINE 30 MG
1 TABLET, EXTENDED RELEASE 24 HR ORAL
Qty: 30 | Refills: 0
Start: 2019-05-14

## 2019-05-14 RX ORDER — LABETALOL HCL 100 MG
400 TABLET ORAL ONCE
Refills: 0 | Status: COMPLETED | OUTPATIENT
Start: 2019-05-14 | End: 2019-05-14

## 2019-05-14 RX ADMIN — Medication 600 MILLIGRAM(S): at 08:09

## 2019-05-14 RX ADMIN — Medication 30 MILLIGRAM(S): at 08:07

## 2019-05-14 RX ADMIN — Medication 400 MILLIGRAM(S): at 09:10

## 2019-05-14 RX ADMIN — Medication 975 MILLIGRAM(S): at 08:10

## 2019-05-14 RX ADMIN — Medication 200 MILLIGRAM(S): at 05:30

## 2019-05-14 NOTE — PROGRESS NOTE ADULT - ATTENDING COMMENTS
post  day 5  s/p mag for sev preclampsia  discharge held yesterday for bP optimization  disharge on labetalol 200 tid and nifedipine 30XL  understood precautions  follow up in 2-3 for wound check and bp check
patient with prevena which is to be removed prior to discharge  patient in need of breast pump at home  encouraged patient to follow up in 1 week for bp and incision check
patient status post preeclampsia on labetalol  will increase labetalol to 200mg BID  anticipate discharge home tomorrow

## 2019-05-14 NOTE — PROGRESS NOTE ADULT - PROBLEM SELECTOR PROBLEM 1
delivery, delivered, current hospitalization

## 2019-05-14 NOTE — PROGRESS NOTE ADULT - ASSESSMENT
29y year old  now POD#3 s/p  section at 32wks gestation, in stable condition.
29y year old  now POD#1 s/p  section at 32wks gestation, in stable condition.
29y year old  now POD#2 s/p  section at 32wks gestation, in stable condition.
29y year old  now POD#5 s/p  section at 32wks gestation, in stable condition.
A/P   Section Day: 4  Patient is feeling well overall.     Continue the current pain medication.   Encourage ambulation and regular diet.   Continue DVT ppx  Plan to discharge on day 3 or 4 according to the normal criteria.

## 2019-05-14 NOTE — PROGRESS NOTE ADULT - SUBJECTIVE AND OBJECTIVE BOX
29y year old  now POD#5 s/p  section at 32wks gestation.     No acute overnight events. Pain well controlled.  Denies headache, lightheadedness, dizziness, visual disturbances, SOB, chest pain, or RUQ pain.  Patient is ambulating, +voiding, +Flatus, -BM  Reports minimal lochia.   +breast feeding, -breast tenderness    VS:   Vital Signs Last 24 Hrs  T(C): 37.1 (14 May 2019 05:49), Max: 37.1 (14 May 2019 05:49)  T(F): 98.8 (14 May 2019 05:49), Max: 98.8 (14 May 2019 05:49)  HR: 75 (14 May 2019 00:00) (67 - 103)  BP: 159/81 (14 May 2019 05:49) (123/78 - 209/110)  RR: 18 (14 May 2019 05:49) (18 - 20)  SpO2: 100% (14 May 2019 05:49) (99% - 100%)    Physical Exam:  General: NAD  Abdomen: soft, ND, firm fundus palpated at the umbilicus. Incision clean, dry, and intact.  Ext: nontender lower extremities, bilaterally.     Labs:                        10.8   8.2   )-----------( 209      ( 13 May 2019 08:16 )             33.9

## 2019-05-14 NOTE — PROGRESS NOTE ADULT - PROBLEM SELECTOR PLAN 1
Continue routine post-partum care. Continue to encourage ambulation and breast feeding. Pain management PRN.
Continue blood pressure monitoring and optimization medication regimen. Continue routine post-partum care. Continue to encourage ambulation and breast feeding. Pain management PRN.
Continue routine post-partum care. Continue to encourage ambulation and breast feeding. Pain management PRN.
Continue routine post-partum care. Continue to encourage ambulation and breast feeding. Pain management PRN.

## 2019-05-28 LAB — SURGICAL PATHOLOGY STUDY: SIGNIFICANT CHANGE UP

## 2019-07-12 NOTE — DISCHARGE NOTE OB - CALL YOUR HEALTHCARE PROVIDER IF YOU ARE EXPERIENCING SYMPTOMS OF DEPRESSION THAT LAST MORE THAN THREE DAYS
PRE-OP DIAGNOSIS:  Abnormal uterine bleeding 12-Jul-2019 09:43:56  Yumi Benitez  Endometrial polyp 12-Jul-2019 09:43:40 Possible Yumi Benitez
Statement Selected

## 2024-07-24 ENCOUNTER — APPOINTMENT (OUTPATIENT)
Dept: FAMILY MEDICINE | Facility: CLINIC | Age: 34
End: 2024-07-24